# Patient Record
Sex: FEMALE | Race: WHITE | NOT HISPANIC OR LATINO | Employment: UNEMPLOYED | URBAN - METROPOLITAN AREA
[De-identification: names, ages, dates, MRNs, and addresses within clinical notes are randomized per-mention and may not be internally consistent; named-entity substitution may affect disease eponyms.]

---

## 2019-04-26 ENCOUNTER — APPOINTMENT (OUTPATIENT)
Dept: RADIOLOGY | Facility: CLINIC | Age: 39
End: 2019-04-26
Payer: COMMERCIAL

## 2019-04-26 ENCOUNTER — OFFICE VISIT (OUTPATIENT)
Dept: URGENT CARE | Facility: CLINIC | Age: 39
End: 2019-04-26
Payer: COMMERCIAL

## 2019-04-26 VITALS
BODY MASS INDEX: 23.19 KG/M2 | HEART RATE: 74 BPM | SYSTOLIC BLOOD PRESSURE: 128 MMHG | WEIGHT: 162 LBS | OXYGEN SATURATION: 95 % | DIASTOLIC BLOOD PRESSURE: 78 MMHG | HEIGHT: 70 IN | RESPIRATION RATE: 16 BRPM | TEMPERATURE: 99.5 F

## 2019-04-26 DIAGNOSIS — S99.912A INJURY OF LEFT ANKLE, INITIAL ENCOUNTER: Primary | ICD-10-CM

## 2019-04-26 DIAGNOSIS — S99.912A INJURY OF LEFT ANKLE, INITIAL ENCOUNTER: ICD-10-CM

## 2019-04-26 PROCEDURE — 99213 OFFICE O/P EST LOW 20 MIN: CPT | Performed by: PREVENTIVE MEDICINE

## 2019-04-26 PROCEDURE — 73610 X-RAY EXAM OF ANKLE: CPT

## 2019-04-26 RX ORDER — VALACYCLOVIR HYDROCHLORIDE 500 MG/1
500 TABLET, FILM COATED ORAL DAILY
Refills: 6 | COMMUNITY
Start: 2019-01-24

## 2019-04-26 RX ORDER — FLUOXETINE 20 MG/1
20 TABLET, FILM COATED ORAL DAILY
COMMUNITY

## 2020-06-26 ENCOUNTER — APPOINTMENT (EMERGENCY)
Dept: RADIOLOGY | Facility: HOSPITAL | Age: 40
End: 2020-06-26
Payer: COMMERCIAL

## 2020-06-26 ENCOUNTER — HOSPITAL ENCOUNTER (EMERGENCY)
Facility: HOSPITAL | Age: 40
Discharge: HOME | End: 2020-06-26
Attending: EMERGENCY MEDICINE | Admitting: EMERGENCY MEDICINE
Payer: COMMERCIAL

## 2020-06-26 VITALS
HEART RATE: 66 BPM | BODY MASS INDEX: 23.62 KG/M2 | OXYGEN SATURATION: 94 % | DIASTOLIC BLOOD PRESSURE: 97 MMHG | RESPIRATION RATE: 20 BRPM | TEMPERATURE: 97.4 F | HEIGHT: 70 IN | SYSTOLIC BLOOD PRESSURE: 133 MMHG | WEIGHT: 165 LBS

## 2020-06-26 DIAGNOSIS — L03.119 CELLULITIS AND ABSCESS OF LEG: Primary | ICD-10-CM

## 2020-06-26 DIAGNOSIS — L02.419 CELLULITIS AND ABSCESS OF LEG: Primary | ICD-10-CM

## 2020-06-26 PROCEDURE — 99283 EMERGENCY DEPT VISIT LOW MDM: CPT

## 2020-06-26 PROCEDURE — 99284 EMERGENCY DEPT VISIT MOD MDM: CPT | Performed by: EMERGENCY MEDICINE

## 2020-06-26 PROCEDURE — 90471 IMMUNIZATION ADMIN: CPT

## 2020-06-26 PROCEDURE — 73590 X-RAY EXAM OF LOWER LEG: CPT

## 2020-06-26 PROCEDURE — 90715 TDAP VACCINE 7 YRS/> IM: CPT | Performed by: EMERGENCY MEDICINE

## 2020-06-26 PROCEDURE — 10060 I&D ABSCESS SIMPLE/SINGLE: CPT | Performed by: EMERGENCY MEDICINE

## 2020-06-26 RX ORDER — SULFAMETHOXAZOLE AND TRIMETHOPRIM 800; 160 MG/1; MG/1
1 TABLET ORAL ONCE
Status: COMPLETED | OUTPATIENT
Start: 2020-06-26 | End: 2020-06-26

## 2020-06-26 RX ORDER — LIDOCAINE HYDROCHLORIDE AND EPINEPHRINE 10; 10 MG/ML; UG/ML
5 INJECTION, SOLUTION INFILTRATION; PERINEURAL ONCE
Status: COMPLETED | OUTPATIENT
Start: 2020-06-26 | End: 2020-06-26

## 2020-06-26 RX ORDER — SULFAMETHOXAZOLE AND TRIMETHOPRIM 800; 160 MG/1; MG/1
1 TABLET ORAL 2 TIMES DAILY
Qty: 20 TABLET | Refills: 0 | Status: SHIPPED | OUTPATIENT
Start: 2020-06-26 | End: 2020-07-06

## 2020-06-26 RX ADMIN — TETANUS TOXOID, REDUCED DIPHTHERIA TOXOID AND ACELLULAR PERTUSSIS VACCINE, ADSORBED 0.5 ML: 5; 2.5; 8; 8; 2.5 SUSPENSION INTRAMUSCULAR at 12:59

## 2020-06-26 RX ADMIN — LIDOCAINE HYDROCHLORIDE,EPINEPHRINE BITARTRATE 5 ML: 10; .01 INJECTION, SOLUTION INFILTRATION; PERINEURAL at 12:34

## 2020-06-26 RX ADMIN — SULFAMETHOXAZOLE AND TRIMETHOPRIM 1 TABLET: 800; 160 TABLET ORAL at 12:59

## 2023-09-01 LAB — HCV AB SER-ACNC: NORMAL

## 2023-09-22 ENCOUNTER — TELEPHONE (OUTPATIENT)
Dept: GASTROENTEROLOGY | Facility: CLINIC | Age: 43
End: 2023-09-22

## 2023-09-22 ENCOUNTER — OFFICE VISIT (OUTPATIENT)
Dept: GASTROENTEROLOGY | Facility: CLINIC | Age: 43
End: 2023-09-22
Payer: COMMERCIAL

## 2023-09-22 VITALS
WEIGHT: 148 LBS | HEIGHT: 70 IN | BODY MASS INDEX: 21.19 KG/M2 | DIASTOLIC BLOOD PRESSURE: 93 MMHG | SYSTOLIC BLOOD PRESSURE: 152 MMHG

## 2023-09-22 DIAGNOSIS — R63.4 UNINTENTIONAL WEIGHT LOSS: Primary | ICD-10-CM

## 2023-09-22 DIAGNOSIS — R79.89 ABNORMAL LFTS: ICD-10-CM

## 2023-09-22 DIAGNOSIS — K76.0 HEPATIC STEATOSIS: ICD-10-CM

## 2023-09-22 PROCEDURE — 99204 OFFICE O/P NEW MOD 45 MIN: CPT | Performed by: STUDENT IN AN ORGANIZED HEALTH CARE EDUCATION/TRAINING PROGRAM

## 2023-09-22 NOTE — PROGRESS NOTES
Consultation - [id number]     Blessing Guevara 44 y.o. female MRN: [id number]  Unit/Bed#:  Encounter: [id number]    Consults    ASSESSMENT and PLAN    1. Unintentional weight loss  Unclear etiology at this time. This is possibly secondary to her increased alcohol use and resultant liver injury. Interestingly her appetite is getting better though she continues to use alcohol. Also discussed she could have had recent gastroenteritis with postinfectious gastroparesis. Should evaluate with upper endoscopy to ensure no obstruction, ulceration, inflammation or mass. Should also undergo colonoscopy to ensure no malignancy. We we will proceed with scheduling, Clenpiq.  - EGD; Future  - Colonoscopy; Future  - sodium picosulfate, magnesium oxide, citric acid (Clenpiq) oral solution; Take 175 mL (1 bottle) the evening before the colonoscopy, between 5 PM and 9 PM, followed by a second 175 mL bottle 5 hours before the colonoscopy. Dispense: 350 mL; Refill: 0    2. Abnormal LFTs  Most likely in setting of alcohol use, AST is more than ALT, nearly 2:1 ratio. Check other viral serologies, iron panel, autoimmune panel. Given her neurologic complaints, check vitamin levels. - Hepatitis A antibody, total; Future  - Hepatitis B core antibody, total; Future  - Hepatitis B surface antibody; Future  - Hepatitis B surface antigen; Future  - Hepatitis C antibody; Future  - Iron, TIBC and Ferritin Panel; Future  - YUNG w/Reflex; Future  - Anti-smooth muscle antibody, IgG; Future  - Vitamin E; Future  - Folate; Future    3. Hepatic steatosis  In setting of alcohol use. No evidence of cirrhosis and her synthetic function appears normal.  Encourage complete cessation and would recheck levels in 3 months and again in 6 months with repeat imaging in 6 months. Chief Complaint   Patient presents with   • Weight Loss     30# in 2 months.            Physician Requesting Consult: No att. providers found    HPI  Elisabeth Fierro is a 44y.o. year old female with a past medical history of SVT status post catheter ablation who presents for unintentional weight loss, abnormal LFTs and hepatic steatosis. She has a history of anxiety and in order to deal with stress started drinking more wine than usual, 2 to 3 glasses a night plus more in the weekends for the past year. Before that she would drink socially. She has not had any abdominal pain, jaundice, confusion, hematemesis or melena. Over the last 2 and half months she has lost 30 pounds with decrease in appetite and at times forcing herself to eat but this is complicated by episodes of nausea and vomiting most immediately after she tries to eat or drink anything. She now feels over the last week is gotten a little bit better and has been gaining about 5 pounds. She has not seen any black or bloody stool, no changes in her bowel patterns. Additionally, she has occasional numbness/tingling's in her hands and feet. She has no history of tobacco use, IV drug use, family history of GI malignancies. Labs on September 1 was notable for WBC 3.1, normal hemoglobin/hematocrit, , protein 8.0, albumin 4.9, bilirubin 0.7, alk phos 52, , , vitamin D 42.8, TSH normal, vitamin B12 392. Acute hepatitis panel negative. Ultrasound notable for hepatomegaly and hepatic steatosis. Patient stated no reported fevers, chills,hematemesis, odynophagia, dysphagia, heartburn, regurgitation, SOB, CP, abdominal pain, changes in bowel movement with diarrhea or constipation, melena, hematochezia, or unintentional weight loss.     GI History:  Prior Colonoscopy: No prior colonoscopy  Prior EGD:  No prior EGD  Family hx:  No reported first degree relatives with colorectal cancer  Surgical hx: No prior abdominal surgeries  Blood thinners: Denies antiplatelet or anticoagulation use  NSAID use: Denies regular NSAID use  DM meds: None  Social Hx: No regular ETOH, smoking, or drug use. Historical Information   Past Medical History:   Diagnosis Date   • Enlarged liver    • SVT (supraventricular tachycardia) (HCC)      Past Surgical History:   Procedure Laterality Date   • CARDIAC ELECTROPHYSIOLOGY MAPPING AND ABLATION       Social History   Social History     Substance and Sexual Activity   Alcohol Use Yes   • Alcohol/week: 21.0 standard drinks of alcohol   • Types: 21 Glasses of wine per week     Social History     Substance and Sexual Activity   Drug Use Never     Social History     Tobacco Use   Smoking Status Never   Smokeless Tobacco Never     Family History   Problem Relation Age of Onset   • Gallbladder disease Mother    • No Known Problems Brother    • Colon cancer Maternal Grandmother    • Colon cancer Paternal Grandmother        Meds/Allergies     No current facility-administered medications for this visit. (Not in a hospital admission)      Allergies   Allergen Reactions   • Nubain [Nalbuphine] Vomiting       PHYSICAL EXAM    Visit Vitals  /93   Ht 5' 10" (1.778 m)   Wt 67.1 kg (148 lb)   BMI 21.24 kg/m²   Smoking Status Never   BSA 1.84 m²     Body mass index is 21.24 kg/m².   General Appearance: NAD, cooperative, alert  Eyes: Anicteric, EOMI  ENT: Normocephalic, atraumatic, normal mucosa  Neck: symmetrical, trachea midline  Lungs: non-labored respirations on room air, no wheezing  CV: 2+ radial pulse  GI:  Soft, non-tender, non-distended; normal bowel sounds; no masses, no organomegaly   Rectal: Deferred  Musculoskeletal: No gross deformities or pitting edema  Skin:  No jaundice, no rashes  Neurologic: awake, alert, oriented, no gross deficits, no asterixis    No results found for: "GLUCOSE", "CALCIUM", "NA", "K", "CO2", "CL", "BUN", "CREATININE"  No results found for: "WBC", "HGB", "MCV", "PLT"  No results found for: "ALT", "AST", "GGT", "ALKPHOS", "TBILI"  No results found for: "AMYLASE"  No results found for: "LIPASE"  No results found for: "IRON", "TIBC", "FERRITIN"  No results found for: "INR"    No results found. Imaging Studies: I have personally reviewed pertinent reports. Pathology, and Other Studies: I have personally reviewed pertinent reports.       REVIEW OF SYSTEMS    CONSTITUTIONAL: negative unless stated in HPI  GASTROINTESTINAL: As noted in the HPI

## 2023-09-27 NOTE — TELEPHONE ENCOUNTER
Scheduled date of combo(as of today):  Physician performing EGD:  Location of EGD:  Instructions reviewed with patient by:  Clearances:

## 2023-10-12 ENCOUNTER — TELEPHONE (OUTPATIENT)
Dept: GASTROENTEROLOGY | Facility: CLINIC | Age: 43
End: 2023-10-12

## 2023-10-12 NOTE — TELEPHONE ENCOUNTER
Procedure confirmed  Colonoscopy/Endoscopy     Via: Voice mail    Instructions given: Given to Patient at Visit     Prep Given: Clenpiq    Call the office if there are any questions.

## 2023-10-13 ENCOUNTER — ANESTHESIA EVENT (OUTPATIENT)
Dept: ANESTHESIOLOGY | Facility: AMBULATORY SURGERY CENTER | Age: 43
End: 2023-10-13

## 2023-10-13 ENCOUNTER — ANESTHESIA (OUTPATIENT)
Dept: ANESTHESIOLOGY | Facility: AMBULATORY SURGERY CENTER | Age: 43
End: 2023-10-13

## 2023-10-23 ENCOUNTER — ANESTHESIA (OUTPATIENT)
Dept: GASTROENTEROLOGY | Facility: AMBULATORY SURGERY CENTER | Age: 43
End: 2023-10-23

## 2023-10-23 ENCOUNTER — ANESTHESIA EVENT (OUTPATIENT)
Dept: GASTROENTEROLOGY | Facility: AMBULATORY SURGERY CENTER | Age: 43
End: 2023-10-23

## 2023-10-23 ENCOUNTER — HOSPITAL ENCOUNTER (OUTPATIENT)
Dept: GASTROENTEROLOGY | Facility: AMBULATORY SURGERY CENTER | Age: 43
Discharge: HOME | End: 2023-10-23
Attending: STUDENT IN AN ORGANIZED HEALTH CARE EDUCATION/TRAINING PROGRAM
Payer: COMMERCIAL

## 2023-10-23 VITALS
WEIGHT: 145 LBS | SYSTOLIC BLOOD PRESSURE: 129 MMHG | BODY MASS INDEX: 20.76 KG/M2 | TEMPERATURE: 97.8 F | DIASTOLIC BLOOD PRESSURE: 78 MMHG | RESPIRATION RATE: 16 BRPM | HEART RATE: 82 BPM | HEIGHT: 70 IN | OXYGEN SATURATION: 100 %

## 2023-10-23 DIAGNOSIS — R63.4 UNINTENTIONAL WEIGHT LOSS: ICD-10-CM

## 2023-10-23 DIAGNOSIS — K22.10 EROSIVE ESOPHAGITIS: Primary | ICD-10-CM

## 2023-10-23 LAB
EXT PREGNANCY TEST URINE: NEGATIVE
EXT. CONTROL: NORMAL

## 2023-10-23 PROCEDURE — 88305 TISSUE EXAM BY PATHOLOGIST: CPT | Performed by: PATHOLOGY

## 2023-10-23 PROCEDURE — 43239 EGD BIOPSY SINGLE/MULTIPLE: CPT | Performed by: STUDENT IN AN ORGANIZED HEALTH CARE EDUCATION/TRAINING PROGRAM

## 2023-10-23 PROCEDURE — 45378 DIAGNOSTIC COLONOSCOPY: CPT | Performed by: STUDENT IN AN ORGANIZED HEALTH CARE EDUCATION/TRAINING PROGRAM

## 2023-10-23 PROCEDURE — 88312 SPECIAL STAINS GROUP 1: CPT | Performed by: PATHOLOGY

## 2023-10-23 RX ORDER — PROPOFOL 10 MG/ML
INJECTION, EMULSION INTRAVENOUS AS NEEDED
Status: DISCONTINUED | OUTPATIENT
Start: 2023-10-23 | End: 2023-10-23

## 2023-10-23 RX ORDER — SODIUM CHLORIDE, SODIUM LACTATE, POTASSIUM CHLORIDE, CALCIUM CHLORIDE 600; 310; 30; 20 MG/100ML; MG/100ML; MG/100ML; MG/100ML
50 INJECTION, SOLUTION INTRAVENOUS CONTINUOUS
Status: DISCONTINUED | OUTPATIENT
Start: 2023-10-23 | End: 2023-10-27 | Stop reason: HOSPADM

## 2023-10-23 RX ORDER — OMEPRAZOLE 20 MG/1
20 CAPSULE, DELAYED RELEASE ORAL EVERY 12 HOURS
Qty: 28 CAPSULE | Refills: 0 | Status: SHIPPED | OUTPATIENT
Start: 2023-10-23 | End: 2023-11-06

## 2023-10-23 RX ADMIN — PROPOFOL 100 MG: 10 INJECTION, EMULSION INTRAVENOUS at 10:41

## 2023-10-23 RX ADMIN — PROPOFOL 100 MG: 10 INJECTION, EMULSION INTRAVENOUS at 10:57

## 2023-10-23 RX ADMIN — SODIUM CHLORIDE, SODIUM LACTATE, POTASSIUM CHLORIDE, CALCIUM CHLORIDE: 600; 310; 30; 20 INJECTION, SOLUTION INTRAVENOUS at 11:13

## 2023-10-23 RX ADMIN — PROPOFOL 100 MG: 10 INJECTION, EMULSION INTRAVENOUS at 10:47

## 2023-10-23 RX ADMIN — PROPOFOL 100 MG: 10 INJECTION, EMULSION INTRAVENOUS at 10:36

## 2023-10-23 RX ADMIN — PROPOFOL 100 MG: 10 INJECTION, EMULSION INTRAVENOUS at 10:54

## 2023-10-23 RX ADMIN — PROPOFOL 100 MG: 10 INJECTION, EMULSION INTRAVENOUS at 10:44

## 2023-10-23 RX ADMIN — PROPOFOL 100 MG: 10 INJECTION, EMULSION INTRAVENOUS at 11:06

## 2023-10-23 RX ADMIN — PROPOFOL 100 MG: 10 INJECTION, EMULSION INTRAVENOUS at 11:02

## 2023-10-23 RX ADMIN — PROPOFOL 100 MG: 10 INJECTION, EMULSION INTRAVENOUS at 10:50

## 2023-10-23 RX ADMIN — SODIUM CHLORIDE, SODIUM LACTATE, POTASSIUM CHLORIDE, CALCIUM CHLORIDE 50 ML/HR: 600; 310; 30; 20 INJECTION, SOLUTION INTRAVENOUS at 10:30

## 2023-10-23 NOTE — ANESTHESIA POSTPROCEDURE EVALUATION
Post-Op Assessment Note    CV Status:  Stable  Pain Score: 0    Pain management: adequate     Mental Status:  Alert and awake   Hydration Status:  Euvolemic   PONV Controlled:  Controlled   Airway Patency:  Patent      Post Op Vitals Reviewed: Yes      Staff: CRNA         No notable events documented.     BP   104/68   Temp   98   Pulse  76   Resp  16   SpO2   99

## 2023-10-23 NOTE — H&P
History and Physical - [facility]  Mae Hammer 44 y.o. female MRN: [id number]    HPI: Mae Hammer is a 44 y.o. female who presents for EGD/colonoscopy for weight loss. REVIEW OF SYSTEMS: Per the HPI, and otherwise unremarkable.     Historical Information   Past Medical History:   Diagnosis Date    Enlarged liver     SVT (supraventricular tachycardia)      Past Surgical History:   Procedure Laterality Date    CARDIAC ELECTROPHYSIOLOGY MAPPING AND ABLATION      ENDOMETRIAL ABLATION       Social History   Social History     Substance and Sexual Activity   Alcohol Use Yes    Alcohol/week: 21.0 standard drinks of alcohol    Types: 21 Glasses of wine per week     Social History     Substance and Sexual Activity   Drug Use Never     Social History     Tobacco Use   Smoking Status Never   Smokeless Tobacco Never     Family History   Problem Relation Age of Onset    Gallbladder disease Mother     No Known Problems Brother     Colon cancer Maternal Grandmother     Colon cancer Paternal Grandmother        Meds/Allergies       Current Outpatient Medications:     sodium picosulfate, magnesium oxide, citric acid (Clenpiq) oral solution    FLUoxetine (PROzac) 20 MG tablet    Loteprednol Etabonate (LOTEMAX OP)    valACYclovir (VALTREX) 500 mg tablet    Current Facility-Administered Medications:     lactated ringers infusion, 50 mL/hr, Intravenous, Continuous, Continue from Pre-op at 10/23/23 1036    Facility-Administered Medications Ordered in Other Encounters:     propofol (DIPRIVAN) 200 MG/20ML bolus injection, , Intravenous, PRN, 100 mg at 10/23/23 1036    Allergies   Allergen Reactions    Nubain [Nalbuphine] Vomiting       Objective     /89   Pulse 83   Temp 97.8 °F (36.6 °C) (Temporal)   Resp (!) 11   Ht 5' 10" (1.778 m)   Wt 65.8 kg (145 lb)   SpO2 96%   BMI 20.81 kg/m²     PHYSICAL EXAM    Gen: NAD AAOx3  Head: Normocephalic, Atraumatic  CV: S1S2 RRR no m/r/g  CHEST: Clear b/l no c/r/w  ABD: soft, +BS NT/ND  EXT: no edema    ASSESSMENT/PLAN:  This is a 44y.o. year old female here for EGD/colonoscopy, and she is stable and optimized for her procedure. EKG, Imaging and results personally reviewed by me     EKG: NSR, Poor R wave progression                           12.9   31.7  )-----------( 171      ( 26 Dec 2018 22:42 )             38.4     12-26    140  |  102  |  54<H>  ----------------------------<  139<H>  4.7   |  19<L>  |  1.49<H>    Ca    9.6      26 Dec 2018 22:42    TPro  7.6  /  Alb  3.4  /  TBili  0.5  /  DBili  x   /  AST  12  /  ALT  14  /  AlkPhos  129<H>  12-26    PT/INR - ( 27 Dec 2018 03:43 )   PT: 15.6 sec;   INR: 1.34 ratio      PTT - ( 27 Dec 2018 03:43 )  PTT:33.7 sec    Trop 11   Lactate 2.3   7.44/37/87/25/97     < from: CT Chest No Cont (12.26.18 @ 23:42) >    FINDINGS:    AIRWAYS AND LUNGS: The trachea is patent. There is moderate to severe   focal narrowing of the left upper lobe bronchus. Significant increase in   the degree of bilateral confluent and nodular airspace opacities, with   near complete consolidation of the left lower lobe, partial consolidation   of the left upper lobe, and partial consolidation of the right middle and   lower lobes.  Patchy right upper lobe hazy and nodular opacities are new   as well. Emphysema.  PLEURA: Trace left pleural effusion. No pneumothorax.  VESSELS: Atherosclerosis  HEART: The heart size is normal. There is no pericardial effusion.   Coronary atherosclerosis  MEDIASTINUM: No significant mediastinal or hilar adenopathy is seen.   NECK AND CHEST WALL: The visualized thyroid is homogeneous. There is no   significant supraclavicular or axillary lymphadenopathy.  UPPER ABDOMEN: The visualized upper abdomen is unremarkable.  BONES: Nondisplaced fractures of the left 10th through 12th ribs with   mild callus formation. These are new since 9/15/2018 and were present on   12/24/2018.    IMPRESSION:    Nondisplaced fractures of the left 10th through 12th ribs with mild   callus formation. These are new since 9/15/2018 and were present on   12/24/2018.    Interval increase in the degree of bilateral confluent and nodular   airspace opacitieswhich may be combination of neoplastic disease and   infection. New nodules likely represent metastatic foci. Follow-up   recommended.    < end of copied text > EKG, Imaging and results personally reviewed by me     EKG: NSR, Poor R wave progression                           12.9   31.7  )-----------( 171      ( 26 Dec 2018 22:42 )             38.4     12-26    140  |  102  |  54<H>  ----------------------------<  139<H>  4.7   |  19<L>  |  1.49<H>    Ca    9.6      26 Dec 2018 22:42    TPro  7.6  /  Alb  3.4  /  TBili  0.5  /  DBili  x   /  AST  12  /  ALT  14  /  AlkPhos  129<H>  12-26    PT/INR - ( 27 Dec 2018 03:43 )   PT: 15.6 sec;   INR: 1.34 ratio      PTT - ( 27 Dec 2018 03:43 )  PTT:33.7 sec    Trop 11   Lactate 2.3   7.44/37/87/25/97     < from: CT Chest No Cont (12.26.18 @ 23:42) >    FINDINGS:    AIRWAYS AND LUNGS: The trachea is patent. There is moderate to severe focal narrowing of the left upper lobe bronchus. Significant increase in the degree of bilateral confluent and nodular airspace opacities, with near complete consolidation of the left lower lobe, partial consolidation of the left upper lobe, and partial consolidation of the right middle and lower lobes.  Patchy right upper lobe hazy and nodular opacities are new as well. Emphysema.  PLEURA: Trace left pleural effusion. No pneumothorax.  VESSELS: Atherosclerosis  HEART: The heart size is normal. There is no pericardial effusion. Coronary atherosclerosis  MEDIASTINUM: No significant mediastinal or hilar adenopathy is seen.   NECK AND CHEST WALL: The visualized thyroid is homogeneous. There is no significant supraclavicular or axillary lymphadenopathy.  UPPER ABDOMEN: The visualized upper abdomen is unremarkable.  BONES: Nondisplaced fractures of the left 10th through 12th ribs with mild callus formation. These are new since 9/15/2018 and were present on 12/24/2018.    IMPRESSION:    Nondisplaced fractures of the left 10th through 12th ribs with mild callus formation. These are new since 9/15/2018 and were present on   12/24/2018.    Interval increase in the degree of bilateral confluent and nodular airspace opacities which may be combination of neoplastic disease and infection. New nodules likely represent metastatic foci. Follow-up recommended.    < end of copied text >

## 2023-10-23 NOTE — ANESTHESIA PREPROCEDURE EVALUATION
Procedure:  EGD  COLONOSCOPY    Relevant Problems   ANESTHESIA (within normal limits)      CARDIO  H/o SVT ablation - still some breakthrough but no meds      GI/HEPATIC  Heavy alcohol use      GYN   (-) Currently pregnant      PULMONARY   (-) Sleep apnea   (-) Smoking   (-) URI (upper respiratory infection)      Physical Exam    Airway    Mallampati score: I  TM Distance: >3 FB  Neck ROM: full     Dental   No notable dental hx     Cardiovascular      Pulmonary      Other Findings         Anesthesia Plan  ASA Score- 2     Anesthesia Type- IV sedation with anesthesia with ASA Monitors. Additional Monitors:     Airway Plan:            Plan Factors-Exercise tolerance (METS): >4 METS. Chart reviewed. Existing labs reviewed. Patient summary reviewed. Patient is not a current smoker. Induction- intravenous. Postoperative Plan-     Informed Consent- Anesthetic plan and risks discussed with patient. I personally reviewed this patient with the CRNA. Discussed and agreed on the Anesthesia Plan with the CRNA. Juan Quarles

## 2023-10-26 PROCEDURE — 88305 TISSUE EXAM BY PATHOLOGIST: CPT | Performed by: PATHOLOGY

## 2023-10-26 PROCEDURE — 88312 SPECIAL STAINS GROUP 1: CPT | Performed by: PATHOLOGY
